# Patient Record
Sex: MALE | Race: WHITE | URBAN - METROPOLITAN AREA
[De-identification: names, ages, dates, MRNs, and addresses within clinical notes are randomized per-mention and may not be internally consistent; named-entity substitution may affect disease eponyms.]

---

## 2018-01-16 ENCOUNTER — HOSPITAL ENCOUNTER (OUTPATIENT)
Dept: NEUROLOGY | Age: 43
Discharge: HOME | End: 2018-01-16
Attending: FAMILY MEDICINE | Admitting: FAMILY MEDICINE

## 2018-01-16 DIAGNOSIS — R20.0 ANESTHESIA OF SKIN: ICD-10-CM

## 2018-01-16 NOTE — PROCEDURES
[address] Nicholas A. Jamey Pulley, M.D. Phone:  [phone]  Fax:  [phone]      Electrodiagnostic Report  Test Date:  2018    Patient: Zachary Hester : 1975 Physician: Davion Bailon D.O.   Sex: Male Height:   Ref Phys: Mati Espinal MD     Patient Complaints:  Patient is a 36year-old male who presents with hand weakness with numbness and tingling onset couple years ago. Intermittent in nature. Patient History / Exam:  PMH: no endocrine disease. + finger surgery 15 yrs ago. no neck or arm surgery PE: reflexes trace, + thumb opposition weakness    Impression: bilateral carpal tunnel syndrome, moderate severity. No evidence of an acute radiculopathy or other entrapment neuropathy. Thank you. NCV & EMG Findings:  Evaluation of the left median motor and the right median motor nerves showed prolonged distal onset latency (L4.7, R5.4 ms). The left median sensory and the right median sensory nerves showed prolonged distal peak latency (L4.8, R5.0 ms). The left ulnar sensory nerve showed reduced amplitude (10.2 µV). All remaining nerves (as indicated in the following tables) were within normal limits. Left vs. Right side comparison data for the ulnar motor nerve indicates abnormal L-R amplitude difference (58.2 %). The ulnar sensory nerve indicates abnormal L-R amplitude difference (64.8 %). All remaining left vs. right side differences were within normal limits. All examined muscles (as indicated in the following table) showed no evidence of electrical instability.                     Davion Bailon D.O.        Nerve Conduction Studies  Anti Sensory Summary Table     Stim Site NR Peak (ms) Norm Peak (ms) P-T Amp (µV) Norm P-T Amp Site1 Site2 Delta-P (ms) Dist (cm) Emanuel (m/s) Norm Emanuel (m/s)   Left Median Anti Sensory (2nd Digit)   Wrist    4.8 <3.6 13.9 >10 Wrist 2nd Digit 4.8 14.0 29    Right Median Anti Nml 0 Nml Nml    Left BrachioRad Radial C5-6 Nml Nml Nml Nml Nml 0 Nml Nml    Left PronatorTeres Median C6-7 Nml Nml Nml Nml Nml 0 Nml Nml    Left Ext Indicis Radial (Post Int) C7-8 Nml Nml Nml Nml Nml 0 Nml Nml    Left 1stDorInt Ulnar C8-T1 Nml Nml Nml Nml Nml 0 Nml Nml    Left Abd Poll Brev Median C8-T1 Nml Nml Nml Nml Nml 0 Nml Nml    Left Cervical Parasp Up Rami C1-3 Nml Nml Nml         Left Cervical Parasp Mid Rami C4-6 Nml Nml Nml         Left Cervical Parasp Low Rami C7-8 Nml Nml Nml           Electronically signed by Lela Beltran DO on 1/16/2018 at 9:00 AM

## 2020-02-12 ENCOUNTER — HOSPITAL ENCOUNTER (OUTPATIENT)
Dept: PHYSICAL THERAPY | Age: 45
Setting detail: THERAPIES SERIES
Discharge: HOME | End: 2020-02-12
Payer: COMMERCIAL

## 2020-02-12 PROCEDURE — 97110 THERAPEUTIC EXERCISES: CPT

## 2020-02-12 PROCEDURE — 97161 PT EVAL LOW COMPLEX 20 MIN: CPT
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
## 2020-02-12 NOTE — FLOWSHEET NOTE
Physical Therapy Daily Treatment Note    Date:  2020    Patient Name:  Nicki Gallegos    :  1975  MRN: [id number]  Restrictions/Precautions:    Medical/Treatment Diagnosis Information:  · Diagnosis: M54.42 chronic L-sided low back pain with L-sided sciatica  Insurance/Certification information:  PT Insurance Information: [employer] -- 30 visits per yr  Physician Information:  Referring Practitioner: Carroll Cristobal DO  Plan of care signed (Y/N):    Visit# / total visits:       G-Code (if applicable):           Modified Oswestry:  36% disability at eval      Time in:   10:15      Timed Treatment: 8  Total Treatment Time:  45    ________________________________________________________________________________________    Pain Level:   -8 /10  SUBJECTIVE:  See eval    OBJECTIVE: see eval    Exercise/Equipment Resistance/Repetitions Other comments     TG nv      Multifidus punch                                          Magnetic click with SB nv      Ab stabilization with BP cuff nv       Neuro re-ed with dowel nv       Foam roll protocol nv                                                              Other Therapeutic Activities:  eval completed, HEP issued and practiced    HEP:  Self-MET for L AI, bridge as parul, cat/camel, quadruped multifidus    Manual Treatments:       MET for L AI nv  L lumbar opening mobilization nv    Modalities:      Test/Measurements:         ASSESSMENT:     See eval    Treatment/Activity Tolerance:   []Patient tolerated treatment well [] Patient limited by fatique  []Patient limited by pain [] Patient limited by other medical complications  [] Other:     Goals:    Short term goals  Time Frame for Short term goals: 3 wks  Short term goal 1: Pt will decrease pain by 50% in frequency or intensity  Short term goal 2: Pt will be ind and compliant with HEP  Short term goal 3: Pt will deny a limitation with sleeping due to pain  Short term goal 4: Pt will deny a functional limitation

## 2020-02-18 ENCOUNTER — HOSPITAL ENCOUNTER (OUTPATIENT)
Dept: PHYSICAL THERAPY | Age: 45
Setting detail: THERAPIES SERIES
Discharge: HOME | End: 2020-02-18
Payer: COMMERCIAL

## 2020-02-18 PROCEDURE — 97110 THERAPEUTIC EXERCISES: CPT

## 2020-02-18 NOTE — FLOWSHEET NOTE
Physical Therapy Daily Treatment Note    Date:  2020    Patient Name:  Kenn Malhotra    :  1975  MRN: [id number]  Restrictions/Precautions:    Medical/Treatment Diagnosis Information:  · Diagnosis: M54.42 chronic L-sided low back pain with L-sided sciatica  Insurance/Certification information:  [facility]  Physician Information:  Referring Practitioner: Ammon Zamora DO  Plan of care signed (Y/N):    Visit# / total visits:       G-Code (if applicable): Modified Oswestry:  36% disability at eval      Time in:   12:01      Timed Treatment: 30  Total Treatment Time:  36    ________________________________________________________________________________________    Pain Level:   4-5 /10  SUBJECTIVE:  Has been having pain with prolonged sitting. Has been inconsistent with HEP    OBJECTIVE: see eval    Exercise/Equipment Resistance/Repetitions Other comments     TG x5'      Multifidus punch Sarahi Arevalo x15 B                                         Magnetic click with SB 6D40\"      Ab stabilization with BP cuff 10\"x10 With LE movements nv      Neuro re-ed with dowel x10 in stand       Foam roll protocol rest x3'  Alt shld flex x10 B  Ceiling reach x10 B  shld horizontal ABD x10  Rest x1'                                                              Other Therapeutic Activities:      HEP:  Self-MET for L AI, bridge as parul, cat/camel, quadruped multifidus    Manual Treatments:       MET for L AI 5\"x3 in prone  L lumbar opening mobilization     Modalities:      Test/Measurements:         ASSESSMENT:     parul treatment well. Noted relief with lumbar opening.     Treatment/Activity Tolerance:   [x]Patient tolerated treatment well [] Patient limited by fatique  []Patient limited by pain [] Patient limited by other medical complications  [] Other:     Goals:    Short term goals  Time Frame for Short term goals: 3 wks  Short term goal 1: Pt will decrease pain by

## 2020-02-21 ENCOUNTER — HOSPITAL ENCOUNTER (OUTPATIENT)
Dept: PHYSICAL THERAPY | Age: 45
Setting detail: THERAPIES SERIES
Discharge: HOME | End: 2020-02-21
Payer: COMMERCIAL

## 2020-02-21 PROCEDURE — 97110 THERAPEUTIC EXERCISES: CPT

## 2020-02-21 NOTE — FLOWSHEET NOTE
test    Treatment/Activity Tolerance:   [x]Patient tolerated treatment well [] Patient limited by fatique  []Patient limited by pain [] Patient limited by other medical complications  [] Other:     Goals:    Short term goals  Time Frame for Short term goals: 3 wks  Short term goal 1: Pt will decrease pain by 50% in frequency or intensity  Short term goal 2: Pt will be ind and compliant with HEP  Short term goal 3: Pt will deny a limitation with sleeping due to pain  Short term goal 4: Pt will deny a functional limitation with sitting due to pain  Short term goal 5: Pt will maintain pelvic alignment between sessions     Long term goals  Time Frame for Long term goals : 6 wks  Long term goal 1: Pt will decrease pain by % in frequency or intensity  Long term goal 2: Pt will deny a limitation with standing due to pain  Long term goal 3: Pt will present with full, painless lumbar AROM  Long term goal 4: Pt will demonstrate proper mechanics with bending and lifting     Plan: [x] Continue per plan of care [] Alter current plan (see comments)   [] Plan of care initiated [] Hold pending MD visit [] Discharge      Plan for Next Session:      Re-Certification Due Date:         Signature:  Pawan Fry , CHRIS

## 2020-02-25 ENCOUNTER — HOSPITAL ENCOUNTER (OUTPATIENT)
Dept: PHYSICAL THERAPY | Age: 45
Setting detail: THERAPIES SERIES
Discharge: HOME | End: 2020-02-25
Payer: COMMERCIAL

## 2020-02-25 PROCEDURE — 97110 THERAPEUTIC EXERCISES: CPT

## 2020-02-25 PROCEDURE — 97140 MANUAL THERAPY 1/> REGIONS: CPT

## 2020-02-28 ENCOUNTER — HOSPITAL ENCOUNTER (OUTPATIENT)
Dept: PHYSICAL THERAPY | Age: 45
Setting detail: THERAPIES SERIES
Discharge: HOME | End: 2020-02-28
Payer: COMMERCIAL

## 2020-02-28 NOTE — PROGRESS NOTES
Physical Therapy  Cancellation/No-show Note  Patient Name:  Subhash Wiggins  :  1975   Date:  2020  Cancels to date: 1  No-shows to date: 0    For today's appointment patient:  [x] Cancelled  [] Rescheduled appointment  [] No-show     Reason given by patient:  [] Patient ill  [] Conflicting appointment  [] No transportation    [x] Conflict with work  [] No reason given  [] Other:      Comments:  Stuck on the other side of [facility]    Electronically signed by:  Bebeto Allen PT

## 2020-03-03 ENCOUNTER — HOSPITAL ENCOUNTER (OUTPATIENT)
Dept: PHYSICAL THERAPY | Age: 45
Setting detail: THERAPIES SERIES
Discharge: HOME | End: 2020-03-03
Payer: COMMERCIAL

## 2020-03-03 PROCEDURE — 97140 MANUAL THERAPY 1/> REGIONS: CPT

## 2020-03-03 PROCEDURE — 97110 THERAPEUTIC EXERCISES: CPT

## 2020-03-06 ENCOUNTER — HOSPITAL ENCOUNTER (OUTPATIENT)
Dept: PHYSICAL THERAPY | Age: 45
Setting detail: THERAPIES SERIES
Discharge: HOME | End: 2020-03-06
Payer: COMMERCIAL

## 2020-03-06 PROCEDURE — 97140 MANUAL THERAPY 1/> REGIONS: CPT

## 2020-03-06 PROCEDURE — 97110 THERAPEUTIC EXERCISES: CPT

## 2020-03-06 NOTE — FLOWSHEET NOTE
Physical Therapy Daily Treatment Note    Date:  3/6/2020    Patient Name:  Teofilo Null    :  1975  MRN: [id number]  Restrictions/Precautions:    Medical/Treatment Diagnosis Information:  · Diagnosis: M54.42 chronic L-sided low back pain with L-sided sciatica  Insurance/Certification information:  PT Insurance Information: [facility] -- 30 visits per yr  Physician Information:  Referring Practitioner: Aden Smith DO  Plan of care signed (Y/N):    Visit# / total visits:       G-Code (if applicable): Modified Oswestry:  36% disability at eval      Time in:   9:05      Timed Treatment: 44  Total Treatment Time:  44    ________________________________________________________________________________________    Pain Level:   3-10  SUBJECTIVE:  Had R-sided back pain the morning after LV, and L-sided LBP last night. OBJECTIVE: (-) supine to long sit test.  L1 in L rotation.     Exercise/Equipment Resistance/Repetitions Other comments     TG x6'      Multifidus punch maroon x20 B    Posterior sling Maroon & 4\" step x15 B      Lateral sling Moses TB x10 B             Prone alt shld flex & hip ext x10 B      Bridge with SB under LEs 3L64      Magnetic click with SB 1N76\"      Ab stabilization with BP cuff  with knee fallouts x10 B Unable to stabilize with march      Neuro re-ed with dowel        Foam roll protocol rest x3'  Alt shld flex x10 B  Ceiling reach x10 B  shld horizontal ABD x10  Rest x1'      Side planks       Anterior sling 5\"x5 B      Hand-heel rock x5                                         Other Therapeutic Activities:      HEP:  Self-MET for L AI, bridge as parul, cat/camel, quadruped multifidus, multifidus punch with gray TB, side planks    Manual Treatments:           R sacral PA mobs gr IV, L L1 PA mob gr IV  Lumbar PA mobs gr IV   R lumbar opening mobilization  sidelying R lumbopelvic roll gr IV   DTM to lumbar paraspinals  RLE distraction in L sidelying with oscillations

## 2020-03-09 ENCOUNTER — HOSPITAL ENCOUNTER (OUTPATIENT)
Dept: PHYSICAL THERAPY | Age: 45
Setting detail: THERAPIES SERIES
Discharge: HOME | End: 2020-03-09
Payer: COMMERCIAL

## 2020-03-09 PROCEDURE — 97110 THERAPEUTIC EXERCISES: CPT

## 2020-03-09 PROCEDURE — 97140 MANUAL THERAPY 1/> REGIONS: CPT

## 2020-03-09 NOTE — FLOWSHEET NOTE
Test/Measurements:         ASSESSMENT:     Normal lumbar and SI alignment. Good joint mobility but felt relief with PA mobs.      Treatment/Activity Tolerance:   [x]Patient tolerated treatment well [] Patient limited by fatique  []Patient limited by pain [] Patient limited by other medical complications  [] Other:     Goals:    Short term goals  Time Frame for Short term goals: 3 wks  Short term goal 1: Pt will decrease pain by 50% in frequency or intensity  Short term goal 2: Pt will be ind and compliant with HEP  Short term goal 3: Pt will deny a limitation with sleeping due to pain  Short term goal 4: Pt will deny a functional limitation with sitting due to pain  Short term goal 5: Pt will maintain pelvic alignment between sessions     Long term goals  Time Frame for Long term goals : 6 wks  Long term goal 1: Pt will decrease pain by % in frequency or intensity  Long term goal 2: Pt will deny a limitation with standing due to pain  Long term goal 3: Pt will present with full, painless lumbar AROM  Long term goal 4: Pt will demonstrate proper mechanics with bending and lifting     Plan: [x] Continue per plan of care [] Alter current plan (see comments)   [] Plan of care initiated [] Hold pending MD visit [] Discharge      Plan for Next Session:      Re-Certification Due Date:         Signature:  Blinda Boeck , PT

## 2020-03-10 ENCOUNTER — APPOINTMENT (OUTPATIENT)
Dept: PHYSICAL THERAPY | Age: 45
End: 2020-03-10
Payer: COMMERCIAL

## 2020-03-13 ENCOUNTER — HOSPITAL ENCOUNTER (OUTPATIENT)
Dept: PHYSICAL THERAPY | Age: 45
Setting detail: THERAPIES SERIES
Discharge: HOME | End: 2020-03-13
Payer: COMMERCIAL

## 2020-03-13 PROCEDURE — 97140 MANUAL THERAPY 1/> REGIONS: CPT

## 2020-03-13 PROCEDURE — 97110 THERAPEUTIC EXERCISES: CPT

## 2020-03-16 ENCOUNTER — HOSPITAL ENCOUNTER (OUTPATIENT)
Dept: PHYSICAL THERAPY | Age: 45
Setting detail: THERAPIES SERIES
Discharge: HOME | End: 2020-03-16
Payer: COMMERCIAL

## 2020-03-16 PROCEDURE — 97140 MANUAL THERAPY 1/> REGIONS: CPT

## 2020-03-16 PROCEDURE — 97110 THERAPEUTIC EXERCISES: CPT

## 2020-03-17 ENCOUNTER — APPOINTMENT (OUTPATIENT)
Dept: PHYSICAL THERAPY | Age: 45
End: 2020-03-17
Payer: COMMERCIAL

## 2020-03-19 ENCOUNTER — APPOINTMENT (OUTPATIENT)
Dept: PHYSICAL THERAPY | Age: 45
End: 2020-03-19
Payer: COMMERCIAL

## 2020-03-20 ENCOUNTER — HOSPITAL ENCOUNTER (OUTPATIENT)
Dept: PHYSICAL THERAPY | Age: 45
Setting detail: THERAPIES SERIES
End: 2020-03-20
Payer: COMMERCIAL

## 2020-03-23 ENCOUNTER — APPOINTMENT (OUTPATIENT)
Dept: PHYSICAL THERAPY | Age: 45
End: 2020-03-23
Payer: COMMERCIAL

## 2020-03-24 ENCOUNTER — APPOINTMENT (OUTPATIENT)
Dept: PHYSICAL THERAPY | Age: 45
End: 2020-03-24
Payer: COMMERCIAL

## 2020-03-27 ENCOUNTER — APPOINTMENT (OUTPATIENT)
Dept: PHYSICAL THERAPY | Age: 45
End: 2020-03-27
Payer: COMMERCIAL

## 2021-02-19 ENCOUNTER — HOSPITAL ENCOUNTER (OUTPATIENT)
Dept: PHYSICAL THERAPY | Age: 46
Setting detail: THERAPIES SERIES
Discharge: HOME | End: 2021-02-19
Payer: COMMERCIAL

## 2021-02-19 PROCEDURE — 97140 MANUAL THERAPY 1/> REGIONS: CPT

## 2021-02-19 PROCEDURE — 97161 PT EVAL LOW COMPLEX 20 MIN: CPT

## 2021-02-19 NOTE — PROGRESS NOTES
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 Notes that the pain is focused on his lower left side. Notes that this stated about 3-4 weeks ago. Usually his pain comes and goes. Notes that pain increases with activity. Denies pain in LEs or buttocks. Does report B hand N&T at night. Also reports previous neck injuries and pain - minor. Denies balance issues. Notes that his legs are pretty strong, no noticeable differences. 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 Objective     Observation/Palpation  Posture: Fair  Palpation: Standing - L iliac crest elevated, LLE pronation; Supine - LLE longer, L ASIS elevated, iliac crest level  Observation: gait - pt ambulated with lumbar extension rotation, decreased glut activation  Body Mechanics: SLS - Trendelenburg BLE, L worse than R    AROM RLE (degrees)  R Hip External Rotation 0-45: 0-25*  AROM LLE (degrees)  LLE AROM : WFL  Spine  Lumbar: Flexion WFL; Extension [facility] but with R glide;  Sidebending - R WFL, L decreased 25% with catch  Joint Mobility  Spine: lumbar - hypomobile to PA joint play throughout lumbar spine    Strength RLE  Comment: hip ER 3+/5, hip ABD 4-/5, glut max 4-/5  Strength LLE  Comment: hip ER 3+/5, hip ABD 4-/5, glut max 4-/5     Additional Measures  Special Tests: (+) slump test LLE; DTRs - L3/4 and S1/2 2+ BLE  Sensation  Overall Sensation Status: WFL     Assessment   Conditions Requiring Skilled Therapeutic Intervention  Body structures, Functions, Activity limitations: Decreased ADL status; Decreased high-level IADLs; Increased pain;Decreased functional mobility ; Decreased ROM  Assessment: Pt presents with L posterior innominate pelvic rotation and associated leg length differential. Pt also demonstrated extension rotation patterns of lumbar spine and poor muscle firing patterns of lumbopelvic stabilizers.   Prognosis: Good  Decision Making: Low Complexity  REQUIRES PT FOLLOW UP: Yes  Activity Tolerance  Activity Tolerance: Patient Tolerated treatment well         Plan   Plan  Times per week: 2x/wk  Plan weeks: 4 weeks  Current Treatment Recommendations: Strengthening, Manual Therapy - Joint Manipulation, Neuromuscular Re-education, Home Exercise Program, Manual Therapy - Soft Tissue Mobilization, ROM, Endurance Training, Gait Training    G-Code   Modified Oswestry 21/50    Goals  Short term goals  Time Frame for Short term goals: 4 weeks  Short term goal 1: Pt will decrease pain by 50% in frequency or intensity  Short term goal 2: Pt will be ind and compliant with HEP  Short term goal 3: Pt will demonstrate an increase in lumbar joint mobility  Short term goal 4: Pt will ambulate with increased glut activation  Short term goal 5: Pt will maintain pelvic alignment between sessions       Therapy Time   Individual Concurrent Group Co-treatment   Time In 0800         Time Out 0845         Minutes 45         Timed Code Treatment Minutes: [address]  [place], [address] [facility] [address]

## 2021-02-19 NOTE — FLOWSHEET NOTE
[facility], [facility], [address] [place] [address]  Phone: [phone]  Fax [phone]    Physical Therapy Daily Treatment Note    Date:  2021    Patient Name:  Jono Hull    :  1975  MRN: [id number]  Restrictions/Precautions:    Medical/Treatment Diagnosis Information:  · Diagnosis: left sided LBP with sciatica  Insurance/Certification information:  PT Insurance Information: [facility]  Physician Information:  Referring Practitioner: Ashwin Ennis MD  Plan of care signed (Y/N): N  Visit# / total visits:       G-Code (if applicable):          ASA     Time in:   8:00     Timed Treatment: 10 Total Treatment Time:  45  ________________________________________________________________________________________    Pain Level:    /10  SUBJECTIVE:  See eval    OBJECTIVE:     Exercise/Equipment Resistance/Repetitions Other comments                                                                                                                                             Other Therapeutic Activities:      Manual Treatments:   Prone SI distraction mobilization grade V; sidelying lumbar opening mobilization grade IV; LLE leg pull      Modalities:      Test/Measurements:  See eval       ASSESSMENT:    See eval     Treatment/Activity Tolerance:   [x]Patient tolerated treatment well [] Patient limited by fatique  []Patient limited by pain [] Patient limited by other medical complications  [] Other:     Goals:    Short term goals  Time Frame for Short term goals: 4 weeks  Short term goal 1: Pt will decrease pain by 50% in frequency or intensity  Short term goal 2: Pt will be ind and compliant with HEP  Short term goal 3: Pt will demonstrate an increase in lumbar joint mobility  Short term goal 4: Pt will ambulate with increased glut activation  Short term goal 5: Pt will maintain pelvic alignment between sessions Plan: [x] Continue per plan of care [] Alter current plan (see comments)   [x] Plan of care initiated [] Hold pending MD visit [] Discharge      Plan for Next Session:  Biomechanical correction of problems as they present. Facilitate correct muscle firing patterns and activation with appropriate activities. Progress patient as tolerated.      Re-Certification Due Date:         Signature:  Farshad Plaza PT

## 2021-02-24 ENCOUNTER — HOSPITAL ENCOUNTER (OUTPATIENT)
Dept: PHYSICAL THERAPY | Age: 46
Setting detail: THERAPIES SERIES
Discharge: HOME | End: 2021-02-24
Payer: COMMERCIAL

## 2021-02-24 PROCEDURE — 97140 MANUAL THERAPY 1/> REGIONS: CPT

## 2021-02-24 PROCEDURE — 97110 THERAPEUTIC EXERCISES: CPT

## 2021-02-24 NOTE — FLOWSHEET NOTE
[facility], [facility], [address] [place] [address]  Phone: [phone]  Fax [phone]    Physical Therapy Daily Treatment Note    Date:  2021    Patient Name:  Mu Barreto    :  1975  MRN: [id number]  Restrictions/Precautions:    Medical/Treatment Diagnosis Information:  · Diagnosis: left sided LBP with sciatica  Insurance/Certification information:  [facility]  Physician Information:  Referring Practitioner: Donna Apodaca MD  Plan of care signed (Y/N): N  Visit# / total visits:       G-Code (if applicable):          ASA     Time in:   9:30     Timed Treatment: 45 Total Treatment Time:  45  ________________________________________________________________________________________    Pain Level:    /10  SUBJECTIVE:  Pt reports that he is feeling better. Has been resting and taking it easy which has been helping. OBJECTIVE:     Exercise/Equipment Resistance/Repetitions Other comments          TA set      BKF Until control demo'd  2 min  x10 B    bridge 10x5\"    Supine clams  SL hip ER with stabilization x10  x10 B    Supine lumbopelvic stabilization  2x1min                                                                                                           Other Therapeutic Activities:      Manual Treatments:   Prone SI distraction mobilization grade V; sidelying lumbar opening mobilization grade IV; LLE leg pull; supine SI distraction mobilization with cavitation; lumbar breaking bread      Modalities:      Test/Measurements:  See eval       ASSESSMENT:    Pt tolerated session well. Corrected SIJD through manual therapy. Initiated HEP without issue.      Treatment/Activity Tolerance:   [x]Patient tolerated treatment well [] Patient limited by fatique  []Patient limited by pain [] Patient limited by other medical complications  [] Other:     Goals:    Short term goals  Time Frame for Short term goals: 4 weeks  Short term goal 1: Pt will decrease pain by 50% in frequency or intensity  Short term goal 2: Pt will be ind and compliant with HEP  Short term goal 3: Pt will demonstrate an increase in lumbar joint mobility  Short term goal 4: Pt will ambulate with increased glut activation  Short term goal 5: Pt will maintain pelvic alignment between sessions           Plan: [x] Continue per plan of care [] Alter current plan (see comments)   [] Plan of care initiated [] Hold pending MD visit [] Discharge      Plan for Next Session:  Biomechanical correction of problems as they present. Facilitate correct muscle firing patterns and activation with appropriate activities. Progress patient as tolerated.      Re-Certification Due Date:         Signature:  Jasbir Garcias PT

## 2021-02-26 ENCOUNTER — HOSPITAL ENCOUNTER (OUTPATIENT)
Dept: PHYSICAL THERAPY | Age: 46
Setting detail: THERAPIES SERIES
Discharge: HOME | End: 2021-02-26
Payer: COMMERCIAL

## 2021-02-26 PROCEDURE — 97140 MANUAL THERAPY 1/> REGIONS: CPT

## 2021-02-26 PROCEDURE — 97110 THERAPEUTIC EXERCISES: CPT

## 2021-02-26 NOTE — FLOWSHEET NOTE
Patient limited by fatique  []Patient limited by pain [] Patient limited by other medical complications  [] Other:     Goals:    Short term goals  Time Frame for Short term goals: 4 weeks  Short term goal 1: Pt will decrease pain by 50% in frequency or intensity  Short term goal 2: Pt will be ind and compliant with HEP  Short term goal 3: Pt will demonstrate an increase in lumbar joint mobility  Short term goal 4: Pt will ambulate with increased glut activation  Short term goal 5: Pt will maintain pelvic alignment between sessions           Plan: [x] Continue per plan of care [] Alter current plan (see comments)   [] Plan of care initiated [] Hold pending MD visit [] Discharge      Plan for Next Session:  Biomechanical correction of problems as they present. Facilitate correct muscle firing patterns and activation with appropriate activities. Progress patient as tolerated.      Re-Certification Due Date:         Signature:  Jackson Garcia PT

## 2021-03-03 ENCOUNTER — HOSPITAL ENCOUNTER (OUTPATIENT)
Dept: PHYSICAL THERAPY | Age: 46
Setting detail: THERAPIES SERIES
Discharge: HOME | End: 2021-03-03
Payer: COMMERCIAL

## 2021-03-03 PROCEDURE — 97140 MANUAL THERAPY 1/> REGIONS: CPT

## 2021-03-03 PROCEDURE — 97110 THERAPEUTIC EXERCISES: CPT

## 2021-03-03 NOTE — FLOWSHEET NOTE
[facility], [facility], [address] [facility], [address] [place] [address] Dr  Phone: [phone]  Fax [phone]    Physical Therapy Daily Treatment Note    Date:  3/3/2021    Patient Name:  Ashanti Knight    :  1975  MRN: [id number]  Restrictions/Precautions:    Medical/Treatment Diagnosis Information:  · Diagnosis: left sided LBP with sciatica  Insurance/Certification information:  [facility] [place]  Physician Information:  Referring Practitioner: Randa Isbell MD  Plan of care signed (Y/N): N  Visit# / total visits:       G-Code (if applicable):          ASA     Time in:   2:45    Timed Treatment: 45 Total Treatment Time:  45  ________________________________________________________________________________________    Pain Level:    /10  SUBJECTIVE:  Pt reports that he is in doing okay today but was pretty bad yesterday. Notes that it shifted over to the L side a little. OBJECTIVE:     Exercise/Equipment Resistance/Repetitions Other comments   TG 5 min    TA set     BKF 15x5\" hold  x10 B BP cuff   bridge 10x5\"    Supine clams  SL hip ER with stabilization x10  x10 B    Supine lumbopelvic stabilization  2x1min    3-way ball compression 5x10\"    PHE x10 B Dowel coty   Quadruped multif hip lift x10 B           Multif press x15 B    B shoulder extension x15    Standing hip abduction x15 B 15#                                                      Other Therapeutic Activities:      Manual Treatments:   ; R keanu without cavitation; sidelying lumbar opening mobilization grade IV R with cavitation; RLE leg pull; supine SI distraction mobilization with cavitation; lumbar breaking bread      Modalities:      Test/Measurements:  R ASIS inferior, RLE shorter       ASSESSMENT:    Pt tolerated session well. Corrected SIJD through manual therapy. Pt with increased mobility and lessened symptoms following session.  Progressed to standing exercise without issue, but pt did require minimal cueing for lumbopelvic and lateral hip stabilization. Treatment/Activity Tolerance:   [x]Patient tolerated treatment well [] Patient limited by fatique  []Patient limited by pain [] Patient limited by other medical complications  [] Other:     Goals:    Short term goals  Time Frame for Short term goals: 4 weeks  Short term goal 1: Pt will decrease pain by 50% in frequency or intensity  Short term goal 2: Pt will be ind and compliant with HEP  Short term goal 3: Pt will demonstrate an increase in lumbar joint mobility  Short term goal 4: Pt will ambulate with increased glut activation  Short term goal 5: Pt will maintain pelvic alignment between sessions           Plan: [x] Continue per plan of care [] Alter current plan (see comments)   [] Plan of care initiated [] Hold pending MD visit [] Discharge      Plan for Next Session:  Biomechanical correction of problems as they present. Facilitate correct muscle firing patterns and activation with appropriate activities. Progress patient as tolerated.      Re-Certification Due Date:         Signature:  Cathy May , PT

## 2021-03-03 NOTE — PROGRESS NOTES
Physical Therapy  Cancellation/No-show Note  Patient Name:  Destiney Amin  :  1975   Date:  3/3/2021  Cancels to date: 0  No-shows to date: 1    For today's appointment patient:  [] Cancelled  [] Rescheduled appointment  [x] No-show     Reason given by patient:  [] Patient ill  [] Conflicting appointment  [] No transportation    [] Conflict with work  [] No reason given  [] Other:     Comments:      Electronically signed by:  Nam Yates PT

## 2021-03-05 ENCOUNTER — HOSPITAL ENCOUNTER (OUTPATIENT)
Dept: PHYSICAL THERAPY | Age: 46
Setting detail: THERAPIES SERIES
Discharge: HOME | End: 2021-03-05
Payer: COMMERCIAL

## 2021-03-05 PROCEDURE — 97110 THERAPEUTIC EXERCISES: CPT

## 2021-03-05 PROCEDURE — 97140 MANUAL THERAPY 1/> REGIONS: CPT

## 2021-03-05 NOTE — FLOWSHEET NOTE
[facility], [facility], [address] [facility], [address] [place] [address]  Phone: [phone]  Fax [phone]    Physical Therapy Daily Treatment Note    Date:  3/5/2021    Patient Name:  Deng Shukla    :  1975  MRN: [id number]  Restrictions/Precautions:    Medical/Treatment Diagnosis Information:  · Diagnosis: left sided LBP with sciatica  Insurance/Certification information:  PT [facility]: [facility] [place]  Physician Information:  Referring Practitioner: Lucina Murillo MD  Plan of care signed (Y/N): N  Visit# / total visits:       G-Code (if applicable):          ASA     Time in:   8:05    Timed Treatment: 45 Total Treatment Time:  45  ________________________________________________________________________________________    Pain Level:    /10  SUBJECTIVE:  Pt reports that he is in doing okay today but was pretty bad yesterday. Notes that it shifted over to the L side a little. OBJECTIVE:     Exercise/Equipment Resistance/Repetitions Other comments   TG 5 min    TA set     BKF 15x5\" hold  x10 B BP cuff   bridge 10x5\"    Supine clams  SL hip ER with stabilization x10  x10 B    Supine lumbopelvic stabilization  2x1min    3-way ball compression 5x10\"    PHE Dowel coyt   Quadruped multif hip lift           Multif press x15 B    B shoulder extension x15    Standing hip abduction x15 B 15#                                                      Other Therapeutic Activities:      Manual Treatments:   ; ; sidelying lumbar opening mobilization grade IV R with cavitation; RLE leg pull; supine SI distraction mobilization with cavitation; lumbar breaking bread      Modalities:      Test/Measurements:  R ASIS inferior, RLE shorter       ASSESSMENT:    Pt tolerated session well. Corrected SIJD through manual therapy. Pt with increased mobility and lessened symptoms following session.  Progressed to standing exercise without issue, but pt did require minimal cueing for lumbopelvic and lateral hip stabilization. Treatment/Activity Tolerance:   [x]Patient tolerated treatment well [] Patient limited by fatique  []Patient limited by pain [] Patient limited by other medical complications  [] Other:     Goals:    Short term goals  Time Frame for Short term goals: 4 weeks  Short term goal 1: Pt will decrease pain by 50% in frequency or intensity  Short term goal 2: Pt will be ind and compliant with HEP  Short term goal 3: Pt will demonstrate an increase in lumbar joint mobility  Short term goal 4: Pt will ambulate with increased glut activation  Short term goal 5: Pt will maintain pelvic alignment between sessions           Plan: [x] Continue per plan of care [] Alter current plan (see comments)   [] Plan of care initiated [] Hold pending MD visit [] Discharge      Plan for Next Session:  Biomechanical correction of problems as they present. Facilitate correct muscle firing patterns and activation with appropriate activities. Progress patient as tolerated.      Re-Certification Due Date:         Signature:  Earle Brown PT

## 2021-03-08 ENCOUNTER — HOSPITAL ENCOUNTER (OUTPATIENT)
Dept: PHYSICAL THERAPY | Age: 46
Setting detail: THERAPIES SERIES
Discharge: HOME | End: 2021-03-08
Payer: COMMERCIAL

## 2021-03-08 PROCEDURE — 97140 MANUAL THERAPY 1/> REGIONS: CPT

## 2021-03-08 PROCEDURE — 97110 THERAPEUTIC EXERCISES: CPT

## 2021-03-08 NOTE — FLOWSHEET NOTE
[facility], [facility], [address] [place] [address], [address] [place] [address]  Phone: [phone]  Fax [phone]    Physical Therapy Daily Treatment Note    Date:  3/8/2021    Patient Name:  Sawyer Macias    :  1975  MRN: [id number]  Restrictions/Precautions:    Medical/Treatment Diagnosis Information:  · Diagnosis: left sided LBP with sciatica  Insurance/Certification information:  [place] [facility] [place] [facility]  Physician Information:  Referring Practitioner: James Simon MD  Plan of care signed (Y/N): N  Visit# / total visits:       G-Code (if applicable):          ASA     Time in:   8:45    Timed Treatment: 45 Total Treatment Time:  45  ________________________________________________________________________________________    Pain Level:    /10  SUBJECTIVE:  Pt reports that he is in doing okay today but was pretty bad yesterday. Notes that it shifted over to the L side a little. OBJECTIVE:     Exercise/Equipment Resistance/Repetitions Other comments   TG 5 min    TA set     BKF 15x5\" hold  x10 B BP cuff   bridge 10x5\"    Supine clams  SL hip ER with stabilization     Supine lumbopelvic stabilization  2x1min    3-way ball compression 5x10\"    PHE Dowel coty   Quadruped multif hip lift           Multif press x15 B    B shoulder extension x15    Standing hip abduction x15 B 15#   Side-stepping 2x30' Grey tb                                               Other Therapeutic Activities:      Manual Treatments:   ; ; sidelying lumbar opening mobilization grade IV R with cavitation; RLE leg pull; supine SI distraction mobilization with cavitation; lumbar breaking bread      Modalities:      Test/Measurements:  R ASIS inferior, RLE shorter       ASSESSMENT:    Pt tolerated session well. Corrected SIJD through manual therapy. Pt with increased mobility and lessened symptoms following session.  Progressed to standing exercise without issue, but pt did require minimal cueing for lumbopelvic and lateral hip stabilization. Treatment/Activity Tolerance:   [x]Patient tolerated treatment well [] Patient limited by fatique  []Patient limited by pain [] Patient limited by other medical complications  [] Other:     Goals:    Short term goals  Time Frame for Short term goals: 4 weeks  Short term goal 1: Pt will decrease pain by 50% in frequency or intensity  Short term goal 2: Pt will be ind and compliant with HEP  Short term goal 3: Pt will demonstrate an increase in lumbar joint mobility  Short term goal 4: Pt will ambulate with increased glut activation  Short term goal 5: Pt will maintain pelvic alignment between sessions           Plan: [x] Continue per plan of care [] Alter current plan (see comments)   [] Plan of care initiated [] Hold pending MD visit [] Discharge      Plan for Next Session:  Biomechanical correction of problems as they present. Facilitate correct muscle firing patterns and activation with appropriate activities. Progress patient as tolerated.      Re-Certification Due Date:         Signature:  Akosua Flores , PT

## 2021-03-11 ENCOUNTER — HOSPITAL ENCOUNTER (OUTPATIENT)
Dept: PHYSICAL THERAPY | Age: 46
Setting detail: THERAPIES SERIES
Discharge: HOME | End: 2021-03-11
Payer: COMMERCIAL

## 2021-03-11 PROCEDURE — 97140 MANUAL THERAPY 1/> REGIONS: CPT

## 2021-03-11 PROCEDURE — 97110 THERAPEUTIC EXERCISES: CPT

## 2021-03-11 NOTE — FLOWSHEET NOTE
[facility], [place], [facility], [address] [place] [address] Dr  Phone: [phone]  Fax [phone]    Physical Therapy Daily Treatment Note    Date:  3/11/2021    Patient Name:  Garland Cardoso    :  1975  MRN: [id number]  Restrictions/Precautions:    Medical/Treatment Diagnosis Information:  · Diagnosis: left sided LBP with sciatica  Insurance/Certification information:  [place] [facility] [facility] [place]  Physician Information:  Referring Practitioner: Tani Gutierrez MD  Plan of care signed (Y/N): N  Visit# / total visits:       G-Code (if applicable):          ASA     Time in:   9:00    Timed Treatment: 45 Total Treatment Time:  45  ________________________________________________________________________________________    Pain Level:    /10  SUBJECTIVE:  Pt reports that he went back to the gym for the first time Monday. Notes he has been sore. Also reports that his back is getting better and he is becoming more functional.     OBJECTIVE:     Exercise/Equipment Resistance/Repetitions Other comments   TG 5 min    TA set     BKF 15x5\" hold  x10 B BP cuff   bridge 10x5\"    Supine clams  SL hip ER with stabilization     Supine lumbopelvic stabilization      3-way ball compression 5x10\"    PHE Dowel coty   Quadruped multif hip lift           Multif press x15 B    B shoulder extension x15    Standing hip abduction x15 B 15#   Side-stepping  Grey tb   Posterior sling step up x15 B                                         Other Therapeutic Activities:      Manual Treatments:   ; ; sidelying lumbar opening mobilization grade IV R with cavitation; RLE leg pull; supine SI distraction mobilization with cavitation; lumbar breaking bread      Modalities:      Test/Measurements:  R ASIS inferior, RLE shorter       ASSESSMENT:    Pt tolerated session well. Corrected SIJD through manual therapy.  Pt with increased mobility and lessened symptoms following session. Progressed to standing exercise without issue, but pt did require minimal cueing for lumbopelvic and lateral hip stabilization. Treatment/Activity Tolerance:   [x]Patient tolerated treatment well [] Patient limited by fatique  []Patient limited by pain [] Patient limited by other medical complications  [] Other:     Goals:    Short term goals  Time Frame for Short term goals: 4 weeks  Short term goal 1: Pt will decrease pain by 50% in frequency or intensity  Short term goal 2: Pt will be ind and compliant with HEP  Short term goal 3: Pt will demonstrate an increase in lumbar joint mobility  Short term goal 4: Pt will ambulate with increased glut activation  Short term goal 5: Pt will maintain pelvic alignment between sessions           Plan: [x] Continue per plan of care [] Alter current plan (see comments)   [] Plan of care initiated [] Hold pending MD visit [] Discharge      Plan for Next Session:  Biomechanical correction of problems as they present. Facilitate correct muscle firing patterns and activation with appropriate activities. Progress patient as tolerated.      Re-Certification Due Date:         Signature:  Watson Ospina , PT

## 2021-03-17 ENCOUNTER — HOSPITAL ENCOUNTER (OUTPATIENT)
Dept: PHYSICAL THERAPY | Age: 46
Setting detail: THERAPIES SERIES
Discharge: HOME | End: 2021-03-17
Payer: COMMERCIAL

## 2021-03-17 PROCEDURE — 97110 THERAPEUTIC EXERCISES: CPT

## 2021-03-17 PROCEDURE — 97140 MANUAL THERAPY 1/> REGIONS: CPT

## 2021-03-17 NOTE — FLOWSHEET NOTE
[facility], [facility], [address] [facility], [address]  Phone: [phone]  Fax [phone]    Physical Therapy Daily Treatment Note    Date:  3/17/2021    Patient Name:  Tabby Swanson    :  1975  MRN: [id number]  Restrictions/Precautions:    Medical/Treatment Diagnosis Information:  · Diagnosis: left sided LBP with sciatica  Insurance/Certification information:  [facility] [place]  Physician Information:  Referring Practitioner: Galina Holt MD  Plan of care signed (Y/N): N  Visit# / total visits:       G-Code (if applicable):          ASA     Time in:   9:30    Timed Treatment: 45 Total Treatment Time:  45  ________________________________________________________________________________________    Pain Level:    /10  SUBJECTIVE:  Pt reports that his back has been doing better. Notes that he is not having pain while lying in bed anymore. He has bene abl eot return to the gym. OBJECTIVE:     Exercise/Equipment Resistance/Repetitions Other comments   TG 5 min    TA set     BKF 15x5\" hold  x10 B BP cuff   bridge 10x5\"    Supine clams  SL hip ER with stabilization     Supine lumbopelvic stabilization      3-way ball compression 5x10\"    PHE Dowel coty   Quadruped multif hip lift           Multif press x15 B    B shoulder extension x15    Standing hip abduction x15 B 15#   Side-stepping  Grey tb   Posterior sling step up x15 B                                         Other Therapeutic Activities:      Manual Treatments:   ; ; sidelying lumbar opening mobilization grade IV R with cavitation; RLE leg pull; supine SI distraction mobilization with cavitation; lumbar breaking bread      Modalities:      Test/Measurements:  R ASIS inferior, RLE shorter       ASSESSMENT:    Pt tolerated session well. Corrected SIJD through manual therapy. Pt with increased mobility and lessened symptoms following session. Progressed to standing exercise without issue, but pt did require minimal cueing for lumbopelvic and lateral hip stabilization. Treatment/Activity Tolerance:   [x]Patient tolerated treatment well [] Patient limited by fatique  []Patient limited by pain [] Patient limited by other medical complications  [] Other:     Goals:    Short term goals  Time Frame for Short term goals: 4 weeks  Short term goal 1: Pt will decrease pain by 50% in frequency or intensity  Short term goal 2: Pt will be ind and compliant with HEP  Short term goal 3: Pt will demonstrate an increase in lumbar joint mobility  Short term goal 4: Pt will ambulate with increased glut activation  Short term goal 5: Pt will maintain pelvic alignment between sessions           Plan: [x] Continue per plan of care [] Alter current plan (see comments)   [] Plan of care initiated [] Hold pending MD visit [] Discharge      Plan for Next Session:  Biomechanical correction of problems as they present. Facilitate correct muscle firing patterns and activation with appropriate activities. Progress patient as tolerated.      Re-Certification Due Date:         Signature:  Marianne Rock , PT

## 2021-03-17 NOTE — PROGRESS NOTES
[facility], [facility], [address]   Phone: [phone]  Fax [phone]    Physical Therapy Progress Note    Date:  3/17/2021    Patient Name:  Mu Barreto    :  1975  MRN: [id number]  Restrictions/Precautions:    Medical/Treatment Diagnosis Information:  · Diagnosis: left sided LBP with sciatica  Insurance/Certification information:  [facility] [place]  Physician Information:  Referring Practitioner: Donna Apodaca MD  Plan of care signed (Y/N): N  Visit# / total visits:     ________________________________________________________________________________________    Pain Level:    /10  SUBJECTIVE:  Pt reports that his back has been doing better. Notes that he is not having pain while lying in bed anymore. He has been able to return to the gym. ASSESSMENT:    Pt has completed 8 visits of physical therapy and has shown good improvements with strength, ROM, movement mechanics and functional mobility. Pt no longer displaying consistent lumbar symptoms. Pt does continue with altered movement mechanics during standing tasks. Pt would benefit from additional therapy at 2x/wk for 2 weeks to address remaining impairments and return to work without limitations.     Treatment/Activity Tolerance:   [x]Patient tolerated treatment well [] Patient limited by fatique  []Patient limited by pain [] Patient limited by other medical complications  [] Other:     Goals:    Short term goals  Time Frame for Short term goals: 4 weeks  Short term goal 1: Pt will decrease pain by 50% in frequency or intensity - MET  Short term goal 2: Pt will be ind and compliant with HEP - MET  Short term goal 3: Pt will demonstrate an increase in lumbar joint mobility - MET  Short term goal 4: Pt will ambulate with increased glut activation -progressing towards  Short term goal 5: Pt will maintain pelvic alignment between sessions - MEt          Plan: [] Continue per plan of care [x] Alter current plan (see comments)   [] Plan of care initiated [] Hold pending MD visit [] Discharge         Signature:  Kevan De La Torre , PT

## 2021-03-19 ENCOUNTER — HOSPITAL ENCOUNTER (OUTPATIENT)
Dept: PHYSICAL THERAPY | Age: 46
Setting detail: THERAPIES SERIES
Discharge: HOME | End: 2021-03-19
Payer: COMMERCIAL

## 2021-03-19 PROCEDURE — 97140 MANUAL THERAPY 1/> REGIONS: CPT

## 2021-03-19 PROCEDURE — 97110 THERAPEUTIC EXERCISES: CPT

## 2021-03-19 NOTE — FLOWSHEET NOTE
[facility] 79. and Therapy, [address]  Phone: [phone]  Fax [phone]    Physical Therapy Daily Treatment Note    Date:  3/19/2021    Patient Name:  Reena Mclain    :  1975  MRN: [id number]  Restrictions/Precautions:    Medical/Treatment Diagnosis Information:  · Diagnosis: left sided LBP with sciatica  Insurance/Certification information:  [place] [facility] [place] [facility] [place] [facility] [place]  Physician Information:  Referring Practitioner: Luis Cornejo MD  Plan of care signed (Y/N): N  Visit# / total visits:      G-Code (if applicable):          ASA     Time in:   8:12    Timed Treatment: 35 Total Treatment Time:  35  ________________________________________________________________________________________    Pain Level:    /10  SUBJECTIVE:  Pt reports that he has been having some soreness in his upper back lately. OBJECTIVE:     Exercise/Equipment Resistance/Repetitions Other comments   TG 5 min    TA set     BKF  BP cuff   bridge 10x5\"    Supine clams  SL hip ER with stabilization     Supine lumbopelvic stabilization  2x1min    3-way ball compression 5x10\"    PHE Dowel coty   Quadruped multif hip lift           Multif press x15 B    B shoulder extension x15    Standing hip abduction x15 B 15#   Side-stepping  Grey tb   Posterior sling step up x15 B                                         Other Therapeutic Activities:      Manual Treatments:   Prone SI distraction mobilization grade V LLE; ; sidelying lumbar opening mobilization grade IV R with cavitation; LLE leg pull; supine SI distraction mobilization with cavitation; lumbar breaking bread      Modalities:      Test/Measurements:  R ASIS inferior, RLE shorter       ASSESSMENT:    Pt tolerated session well. Corrected SIJD through manual therapy. Pt with increased mobility and lessened symptoms following session.  Progressed to standing exercise without issue, but pt did require minimal cueing for lumbopelvic and lateral hip stabilization. Treatment/Activity Tolerance:   [x]Patient tolerated treatment well [] Patient limited by fatique  []Patient limited by pain [] Patient limited by other medical complications  [] Other:     Goals:    Short term goals  Time Frame for Short term goals: 4 weeks  Short term goal 1: Pt will decrease pain by 50% in frequency or intensity  Short term goal 2: Pt will be ind and compliant with HEP  Short term goal 3: Pt will demonstrate an increase in lumbar joint mobility  Short term goal 4: Pt will ambulate with increased glut activation  Short term goal 5: Pt will maintain pelvic alignment between sessions           Plan: [x] Continue per plan of care [] Alter current plan (see comments)   [] Plan of care initiated [] Hold pending MD visit [] Discharge      Plan for Next Session:  Biomechanical correction of problems as they present. Facilitate correct muscle firing patterns and activation with appropriate activities. Progress patient as tolerated.      Re-Certification Due Date:         Signature:  Akosua Flores , PT

## 2021-03-22 ENCOUNTER — HOSPITAL ENCOUNTER (OUTPATIENT)
Dept: PHYSICAL THERAPY | Age: 46
Setting detail: THERAPIES SERIES
Discharge: HOME | End: 2021-03-22
Payer: COMMERCIAL

## 2021-03-22 PROCEDURE — 97140 MANUAL THERAPY 1/> REGIONS: CPT

## 2021-03-22 PROCEDURE — 97110 THERAPEUTIC EXERCISES: CPT

## 2021-03-22 NOTE — FLOWSHEET NOTE
[facility], [facility], [address] [place] [address] Dr  Phone: [phone]  Fax [phone]    Physical Therapy Daily Treatment Note    Date:  3/22/2021    Patient Name:  Reena Mclain    :  1975  MRN: [id number]  Restrictions/Precautions:    Medical/Treatment Diagnosis Information:  · Diagnosis: left sided LBP with sciatica  Insurance/Certification information:  PT Insurance Information: [employer]  Physician Information:  Referring Practitioner: Luis Cornejo MD  Plan of care signed (Y/N): N  Visit# / total visits:   2    G-Code (if applicable):          ASA     Time in:   8:45    Timed Treatment: 35 Total Treatment Time:  35  ________________________________________________________________________________________    Pain Level:    /10  SUBJECTIVE:  Pt reports that he has been having some soreness in his upper back lately. OBJECTIVE:     Exercise/Equipment Resistance/Repetitions Other comments   TG 5 min    TA set     BKF  BP cuff   bridge 10x5\"    Supine clams  SL hip ER with stabilization     Supine lumbopelvic stabilization  2x1min    3-way ball compression 5x10\"    PHE Dowel coty   Quadruped multif hip lift           Multif press x15 B    B shoulder extension x15    Standing hip abduction     Extension x15 B  x15 B 15#  40#   Side-stepping  Grey tb   Posterior sling step up x15 B    Rockerboard 1 min rocking  1 min balance                                  Other Therapeutic Activities:      Manual Treatments:   Prone SI distraction mobilization grade V LLE; ; sidelying lumbar opening mobilization grade IV R with cavitation; LLE leg pull; supine SI distraction mobilization with cavitation; lumbar breaking bread; skin rolling; seated thoracic hug, seated CTJ lift with cavitation    Modalities:      Test/Measurements:  R ASIS inferior, RLE shorter       ASSESSMENT:    Pt tolerated session well.  Pt with increased mobility and lessened symptoms following session. Progressed to standing exercise without issue, but pt did require minimal cueing for lumbopelvic and lateral hip stabilization. Treatment/Activity Tolerance:   [x]Patient tolerated treatment well [] Patient limited by fatique  []Patient limited by pain [] Patient limited by other medical complications  [] Other:     Goals:    Short term goals  Time Frame for Short term goals: 4 weeks  Short term goal 1: Pt will decrease pain by 50% in frequency or intensity  Short term goal 2: Pt will be ind and compliant with HEP  Short term goal 3: Pt will demonstrate an increase in lumbar joint mobility  Short term goal 4: Pt will ambulate with increased glut activation  Short term goal 5: Pt will maintain pelvic alignment between sessions           Plan: [x] Continue per plan of care [] Alter current plan (see comments)   [] Plan of care initiated [] Hold pending MD visit [] Discharge      Plan for Next Session:  Biomechanical correction of problems as they present. Facilitate correct muscle firing patterns and activation with appropriate activities. Progress patient as tolerated.      Re-Certification Due Date:         Signature:  Kevan De La Torre , PT

## 2021-03-26 ENCOUNTER — HOSPITAL ENCOUNTER (OUTPATIENT)
Dept: PHYSICAL THERAPY | Age: 46
Setting detail: THERAPIES SERIES
Discharge: HOME | End: 2021-03-26
Payer: COMMERCIAL

## 2021-03-26 PROCEDURE — 97140 MANUAL THERAPY 1/> REGIONS: CPT

## 2021-03-26 PROCEDURE — 97110 THERAPEUTIC EXERCISES: CPT

## 2021-03-26 NOTE — FLOWSHEET NOTE
shorter       ASSESSMENT:    Pt tolerated session well. Pt with increased mobility and lessened symptoms following session. Progressed to standing exercise without issue, but pt did require minimal cueing for lumbopelvic and lateral hip stabilization. Treatment/Activity Tolerance:   [x]Patient tolerated treatment well [] Patient limited by fatique  []Patient limited by pain [] Patient limited by other medical complications  [] Other:     Goals:    Short term goals  Time Frame for Short term goals: 4 weeks  Short term goal 1: Pt will decrease pain by 50% in frequency or intensity  Short term goal 2: Pt will be ind and compliant with HEP  Short term goal 3: Pt will demonstrate an increase in lumbar joint mobility  Short term goal 4: Pt will ambulate with increased glut activation  Short term goal 5: Pt will maintain pelvic alignment between sessions           Plan: [x] Continue per plan of care [] Alter current plan (see comments)   [] Plan of care initiated [] Hold pending MD visit [] Discharge      Plan for Next Session:  Biomechanical correction of problems as they present. Facilitate correct muscle firing patterns and activation with appropriate activities. Progress patient as tolerated.      Re-Certification Due Date:         Signature:  Little Yung , PT

## 2021-03-30 NOTE — PRE-CERTIFICATION NOTE
[facility] approved 60 units of each CPT code April 1 to June 1,2021.      [id number]   [id number]   [facility]. # [id number]

## 2021-04-06 ENCOUNTER — HOSPITAL ENCOUNTER (OUTPATIENT)
Dept: PHYSICAL THERAPY | Age: 46
Setting detail: THERAPIES SERIES
Discharge: HOME | End: 2021-04-06
Payer: COMMERCIAL

## 2021-04-06 PROCEDURE — 97110 THERAPEUTIC EXERCISES: CPT

## 2021-04-06 PROCEDURE — 97140 MANUAL THERAPY 1/> REGIONS: CPT

## 2021-04-06 NOTE — FLOWSHEET NOTE
[facility] and Therapy[address]  Phone: [phone]  Fax [phone]    Physical Therapy Daily Treatment Note    Date:  2021    Patient Name:  Gabrielle Murphy    :  1975  MRN: [id number]  Restrictions/Precautions:    Medical/Treatment Diagnosis Information:  · Diagnosis: left sided LBP with sciatica  Insurance/Certification information:  [facility]  Physician Information:  Referring Practitioner: Hernan Mata MD  Plan of care signed (Y/N): N  Visit# / total visits:      G-Code (if applicable):          ASA     Time in:   3:45    Timed Treatment: 45 Total Treatment Time:  45  ________________________________________________________________________________________    Pain Level:    /10  SUBJECTIVE:  Pt reports that his mid back is feeling better. Has been going to the gym once a week. OBJECTIVE:     Exercise/Equipment Resistance/Repetitions Other comments   TG 5 min    TA set     BKF  BP cuff   bridge 10x5\"    Supine clams  SL hip ER with stabilization     Supine lumbopelvic stabilization  2x1min    3-way ball compression 5x10\"    PHE Dowel coty   Quadruped multif hip lift           Multif press x15 B    B shoulder extension x15    Standing hip abduction     Extension x15 B  x15 B 15#  40#   Side-stepping  Grey tb   Posterior sling step up x15 B    Rockerboard 1 min rocking  1 min balance    Dowel coty training 5 min                           Other Therapeutic Activities:      Manual Treatments:   ; ; sidelying lumbar opening mobilization grade IV R with cavitation; LLE leg pull; supine SI distraction mobilization with cavitation; lumbar breaking bread; skin rolling; seated thoracic hug, seated CTJ lift with cavitation    Modalities:      Test/Measurements:  R ASIS inferior, RLE shorter       ASSESSMENT:    Pt tolerated session well.  Pt with increased mobility and lessened symptoms following session. Progressed to standing exercise without issue, but pt did require minimal cueing for lumbopelvic and lateral hip stabilization. Pt demonstrated poor initial sequencing with dowel coty training. Treatment/Activity Tolerance:   [x]Patient tolerated treatment well [] Patient limited by fatique  []Patient limited by pain [] Patient limited by other medical complications  [] Other:     Goals:    Short term goals  Time Frame for Short term goals: 4 weeks  Short term goal 1: Pt will decrease pain by 50% in frequency or intensity  Short term goal 2: Pt will be ind and compliant with HEP  Short term goal 3: Pt will demonstrate an increase in lumbar joint mobility  Short term goal 4: Pt will ambulate with increased glut activation  Short term goal 5: Pt will maintain pelvic alignment between sessions           Plan: [x] Continue per plan of care [] Alter current plan (see comments)   [] Plan of care initiated [] Hold pending MD visit [] Discharge      Plan for Next Session:  Biomechanical correction of problems as they present. Facilitate correct muscle firing patterns and activation with appropriate activities. Progress patient as tolerated.      Re-Certification Due Date:         Signature:  Estella Rodriguez , PT

## 2021-04-08 ENCOUNTER — HOSPITAL ENCOUNTER (OUTPATIENT)
Dept: PHYSICAL THERAPY | Age: 46
Setting detail: THERAPIES SERIES
Discharge: HOME | End: 2021-04-08
Payer: COMMERCIAL

## 2021-04-08 PROCEDURE — 97110 THERAPEUTIC EXERCISES: CPT

## 2021-04-08 PROCEDURE — 97140 MANUAL THERAPY 1/> REGIONS: CPT

## 2021-04-08 NOTE — FLOWSHEET NOTE
[facility], [facility] [address]   Phone: [phone]  Fax [phone]    Physical Therapy Daily Treatment Note    Date:  2021    Patient Name:  Kathya Crowder    :  1975  MRN: [id number]  Restrictions/Precautions:    Medical/Treatment Diagnosis Information:  · Diagnosis: left sided LBP with sciatica  Insurance/Certification information:  [facility]  Physician Information:  Referring Practitioner: Sharita Moreno MD  Plan of care signed (Y/N): N  Visit# / total visits:      G-Code (if applicable):          ASA     Time in:   3:45    Timed Treatment: 45 Total Treatment Time:  45  ________________________________________________________________________________________    Pain Level:    /10  SUBJECTIVE:  Nothing new to report. Still doing well. OBJECTIVE:     Exercise/Equipment Resistance/Repetitions Other comments   TG 5 min    TA set     BKF  BP cuff   bridge 10x5\"    Supine clams  SL hip ER with stabilization     Supine lumbopelvic stabilization  2x1min    3-way ball compression 5x10\"    PHE Dowel coty   Quadruped multif hip lift           Multif press x15 B    B shoulder extension x15    Standing hip abduction     Extension x15 B  x15 B 15#  40#   Side-stepping  Grey tb   Posterior sling step up x15 B    Rockerboard 1 min rocking  1 min balance    Dowel coty training 5 min                           Other Therapeutic Activities:      Manual Treatments:   ; ; sidelying lumbar opening mobilization grade IV R with cavitation; LLE leg pull; supine SI distraction mobilization with cavitation; lumbar breaking bread; skin rolling;     Modalities:      Test/Measurements:  R ASIS inferior, RLE shorter       ASSESSMENT:    Pt tolerated session well. Pt with increased mobility and lessened symptoms following session.  Progressed to standing exercise without issue, but pt did require minimal cueing for lumbopelvic and lateral hip stabilization. Pt demonstrated poor initial sequencing with dowel coty training. Treatment/Activity Tolerance:   [x]Patient tolerated treatment well [] Patient limited by fatique  []Patient limited by pain [] Patient limited by other medical complications  [] Other:     Goals:    Short term goals  Time Frame for Short term goals: 4 weeks  Short term goal 1: Pt will decrease pain by 50% in frequency or intensity  Short term goal 2: Pt will be ind and compliant with HEP  Short term goal 3: Pt will demonstrate an increase in lumbar joint mobility  Short term goal 4: Pt will ambulate with increased glut activation  Short term goal 5: Pt will maintain pelvic alignment between sessions           Plan: [x] Continue per plan of care [] Alter current plan (see comments)   [] Plan of care initiated [] Hold pending MD visit [] Discharge      Plan for Next Session:  Biomechanical correction of problems as they present. Facilitate correct muscle firing patterns and activation with appropriate activities. Progress patient as tolerated.      Re-Certification Due Date:         Signature:  Oseas Hightower , PT

## 2021-04-08 NOTE — PROGRESS NOTES
[facility] Therapy, [facility], [address], [address]  Phone: [phone]  Fax [phone]    Physical Therapy Progress Note    Date:  2021    Patient Name:  Gallito Betts    :  1975  MRN: [id number]  Restrictions/Precautions:    Medical/Treatment Diagnosis Information:  · Diagnosis: left sided LBP with sciatica  Insurance/Certification information:  PT Insurance Information: [employer]  Physician Information:  Referring Practitioner: Elaina Holliday MD  Plan of care signed (Y/N): N  Visit# / total visits:     ________________________________________________________________________________________    Pain Level:    /10  SUBJECTIVE:  Pt reports that his mid back is finally feeling better. Has been going to the gym once a week but would like to start going more. He is almost done renovating his last house - makes note that he has bene doing most of the work himself but should probably hire someone to help for his future projects. ASSESSMENT:    Pt has completed 12 visits of physical therapy and has shown good improvements with strength, ROM, movement mechanics and functional mobility. Pt no longer complaining of lumbar symptoms. Pt does report intermitted thoracic symptoms with associated hypomobility upon joint testing. Pt does continue with altered movement mechanics during standing tasks. Pt would benefit from additional therapy at 2x/wk for 3 weeks to address remaining impairments and return to work without limitations.     Treatment/Activity Tolerance:   [x]Patient tolerated treatment well [] Patient limited by fatique  []Patient limited by pain [] Patient limited by other medical complications  [] Other:     Goals:    Short term goals  Time Frame for Short term goals: 4 weeks  Short term goal 1: Pt will decrease pain by 50% in frequency or intensity - MET  Short term goal 2: Pt will be ind and compliant with HEP - MET  Short term goal 3: Pt will demonstrate an increase in lumbar joint mobility - MET  Short term goal 4: Pt will ambulate with increased glut activation -progressing towards  Short term goal 5: Pt will maintain pelvic alignment between sessions - MET at times          Plan: [] Continue per plan of care [x] Alter current plan (see comments)   [] Plan of care initiated [] Hold pending MD visit [] Discharge         Signature:  Giuliano Sarabia PT        Dear Referring Practitioner: Dr. Zehra Monahan,     We had the pleasure of evaluating the following patient for physical therapy services at [facility]. A summary of our findings can be found in the initial assessment below. This includes our plan of care. If you have any questions or concerns regarding these findings, please do not hesitate to contact me at the office phone number.   Thank you for the referral.         Physician Signature:_______________________________Date:__________________  By signing above (or electronic signature), therapists plan is approved by physician

## 2021-04-14 ENCOUNTER — HOSPITAL ENCOUNTER (OUTPATIENT)
Dept: PHYSICAL THERAPY | Age: 46
Setting detail: THERAPIES SERIES
Discharge: HOME | End: 2021-04-14
Payer: COMMERCIAL

## 2021-04-15 ENCOUNTER — HOSPITAL ENCOUNTER (OUTPATIENT)
Dept: PHYSICAL THERAPY | Age: 46
Setting detail: THERAPIES SERIES
Discharge: HOME | End: 2021-04-15
Payer: COMMERCIAL

## 2021-04-15 PROCEDURE — 97140 MANUAL THERAPY 1/> REGIONS: CPT

## 2021-04-15 PROCEDURE — 97110 THERAPEUTIC EXERCISES: CPT

## 2021-04-15 NOTE — FLOWSHEET NOTE
[facility]. and Therapy, [address]  Phone: [phone]  Fax [phone]    Physical Therapy Daily Treatment Note    Date:  4/15/2021    Patient Name:  Pedro Powell    :  1975  MRN: [id number]  Restrictions/Precautions:    Medical/Treatment Diagnosis Information:  · Diagnosis: left sided LBP with sciatica  Insurance/Certification information:  PT Insurance Information: [facility]  Physician Information:  Referring Practitioner: Ashish Cleaning MD  Plan of care signed (Y/N): N  Visit# / total visits:   2    G-Code (if applicable):          ASA     Time in:   8:20    Timed Treatment: 40 Total Treatment Time:  40  ________________________________________________________________________________________    Pain Level:    /10  SUBJECTIVE:  Pt reports that the catch in his mid back has finally released. \"As we work on certain places, I feel like it is putting pressure on other places. \"    OBJECTIVE:     Exercise/Equipment Resistance/Repetitions Other comments   TG 5 min    TA set     BKF  BP cuff   bridge 10x5\"    Supine clams  SL hip ER with stabilization     Supine lumbopelvic stabilization  2x1min    3-way ball compression 5x10\"    PHE Dowel coty   Quadruped multif hip lift           Multif press x15 B    B shoulder extension x15    Standing hip abduction     Extension x15 B  x15 B 20#  40#   Side-stepping  Grey tb   Posterior sling step up x15 B    Rockerboard 1 min rocking  1 min balance    Dowel coty training                            Other Therapeutic Activities:      Manual Treatments:   ; ; sidelying lumbar opening mobilization grade IV R with cavitation; LLE leg pull; supine SI distraction mobilization with cavitation; lumbar breaking bread; skin rolling;     Modalities:      Test/Measurements:  R ASIS inferior, RLE shorter       ASSESSMENT:    Pt tolerated session well.  Pt with increased mobility and lessened symptoms following session. Treatment/Activity Tolerance:   [x]Patient tolerated treatment well [] Patient limited by fatique  []Patient limited by pain [] Patient limited by other medical complications  [] Other:     Goals:    Short term goals  Time Frame for Short term goals: 4 weeks  Short term goal 1: Pt will decrease pain by 50% in frequency or intensity  Short term goal 2: Pt will be ind and compliant with HEP  Short term goal 3: Pt will demonstrate an increase in lumbar joint mobility  Short term goal 4: Pt will ambulate with increased glut activation  Short term goal 5: Pt will maintain pelvic alignment between sessions           Plan: [x] Continue per plan of care [] Alter current plan (see comments)   [] Plan of care initiated [] Hold pending MD visit [] Discharge      Plan for Next Session:  Biomechanical correction of problems as they present. Facilitate correct muscle firing patterns and activation with appropriate activities. Progress patient as tolerated.      Re-Certification Due Date:         Signature:  Devonte Hill , PT

## 2021-04-16 ENCOUNTER — HOSPITAL ENCOUNTER (OUTPATIENT)
Dept: PHYSICAL THERAPY | Age: 46
Setting detail: THERAPIES SERIES
Discharge: HOME | End: 2021-04-16
Payer: COMMERCIAL

## 2021-04-16 PROCEDURE — 97110 THERAPEUTIC EXERCISES: CPT

## 2021-04-16 PROCEDURE — 97140 MANUAL THERAPY 1/> REGIONS: CPT

## 2021-04-16 NOTE — FLOWSHEET NOTE
[facility], [facility], [address] [facility], [address] [place] [address] Dr  Phone: [phone]  Fax [phone]    Physical Therapy Daily Treatment Note    Date:  2021    Patient Name:  Gabrielle Murphy    :  1975  MRN: [id number]  Restrictions/Precautions:    Medical/Treatment Diagnosis Information:  · Diagnosis: left sided LBP with sciatica  Insurance/Certification information:  [facility]  Physician Information:  Referring Practitioner: Hernan Mata MD  Plan of care signed (Y/N): N  Visit# / total visits:   4 3/    G-Code (if applicable):          ASA     Time in:   7:24   Timed Treatment: 38 Total Treatment Time:  38  ________________________________________________________________________________________    Pain Level:    /10  SUBJECTIVE:  Pt reports that he is still feeling the cath in his mid back a little. Notes no pain in the lower back. OBJECTIVE:     Exercise/Equipment Resistance/Repetitions Other comments   TG 5 min    TA set     BKF  BP cuff   bridge 10x5\" Green band at knees   Supine clams  SL hip ER with stabilization     Supine lumbopelvic stabilization  2x1min    3-way ball compression 5x10\"    PHE Dowel coty   Quadruped multif hip lift           Multif press x15 B    B shoulder extension x15    Standing hip abduction     Extension x15 B  x15 B 20#  40#   Side-stepping  Grey tb   Posterior sling step up x15 B    Rockerboard     Dowel coty training                            Other Therapeutic Activities:      Manual Treatments:   ; ; sidelying lumbar opening mobilization grade IV R with cavitation; LLE leg pull; supine SI distraction mobilization with cavitation; lumbar breaking bread; skin rolling; seated CTJ lift with cavitation    Modalities:      Test/Measurements:  R ASIS inferior, RLE shorter       ASSESSMENT:    Pt tolerated session well.  Pt with increased mobility and lessened symptoms following session. Treatment/Activity Tolerance:   [x]Patient tolerated treatment well [] Patient limited by fatique  []Patient limited by pain [] Patient limited by other medical complications  [] Other:     Goals:    Short term goals  Time Frame for Short term goals: 4 weeks  Short term goal 1: Pt will decrease pain by 50% in frequency or intensity  Short term goal 2: Pt will be ind and compliant with HEP  Short term goal 3: Pt will demonstrate an increase in lumbar joint mobility  Short term goal 4: Pt will ambulate with increased glut activation  Short term goal 5: Pt will maintain pelvic alignment between sessions           Plan: [x] Continue per plan of care [] Alter current plan (see comments)   [] Plan of care initiated [] Hold pending MD visit [] Discharge      Plan for Next Session:  Biomechanical correction of problems as they present. Facilitate correct muscle firing patterns and activation with appropriate activities. Progress patient as tolerated.      Re-Certification Due Date:         Signature:  Stacia Carbajal , PT

## 2021-04-28 ENCOUNTER — HOSPITAL ENCOUNTER (OUTPATIENT)
Dept: PHYSICAL THERAPY | Age: 46
Setting detail: THERAPIES SERIES
Discharge: HOME | End: 2021-04-28
Payer: COMMERCIAL

## 2021-04-28 PROCEDURE — 97110 THERAPEUTIC EXERCISES: CPT

## 2021-04-28 NOTE — FLOWSHEET NOTE
[facility] [address] [facility], [address] [place] [address]  Phone: [phone]  Fax [phone]    Physical Therapy Daily Treatment Note    Date:  2021    Patient Name:  Shar Nolasco    :  1975  MRN: [id number]  Restrictions/Precautions:    Medical/Treatment Diagnosis Information:  · Diagnosis: left sided LBP with sciatica  Insurance/Certification information:  [facility]  Physician Information:  Referring Practitioner: Kannan Luna MD  Plan of care signed (Y/N): N  Visit# / total visits:      G-Code (if applicable):          ASA     Time in:   12:47   Timed Treatment: 30 Total Treatment Time:  30  ________________________________________________________________________________________    Pain Level:    /10  SUBJECTIVE:  Pt reports that he is feeling good. Has been working out during the week. OBJECTIVE:     Exercise/Equipment Resistance/Repetitions Other comments   TG 5 min    TA set     BKF  BP cuff   bridge 10x5\" Green band at knees   Supine clams  SL hip ER with stabilization     Supine lumbopelvic stabilization  2x1min    3-way ball compression 5x10\" ea    PHE Dowel coty   Quadruped multif hip lift           Multif press x15 B    B shoulder extension x15    Standing hip abduction     Extension x15 B  x15 B 20#  40#   Side-stepping  Grey tb   Posterior sling step up x15 B    Rockerboard     Dowel coty training 5 min                           Other Therapeutic Activities:      Manual Treatments:   ; ; sidelying lumbar opening mobilization grade IV R with cavitation; LLE leg pull; supine SI distraction mobilization with cavitation;     Modalities:      Test/Measurements:  R ASIS inferior, RLE shorter       ASSESSMENT:    Pt tolerated session well. Pt with increased mobility and lessened symptoms following session.     Treatment/Activity Tolerance:   [x]Patient tolerated treatment well [] Patient limited by fatique  []Patient limited by pain [] Patient limited by other medical complications  [] Other:     Goals:    Short term goals  Time Frame for Short term goals: 4 weeks  Short term goal 1: Pt will decrease pain by 50% in frequency or intensity  Short term goal 2: Pt will be ind and compliant with HEP  Short term goal 3: Pt will demonstrate an increase in lumbar joint mobility  Short term goal 4: Pt will ambulate with increased glut activation  Short term goal 5: Pt will maintain pelvic alignment between sessions           Plan: [x] Continue per plan of care [] Alter current plan (see comments)   [] Plan of care initiated [] Hold pending MD visit [] Discharge      Plan for Next Session:  Biomechanical correction of problems as they present. Facilitate correct muscle firing patterns and activation with appropriate activities. Progress patient as tolerated.      Re-Certification Due Date:         Signature:  Devonte Hill PT

## 2021-05-03 ENCOUNTER — HOSPITAL ENCOUNTER (OUTPATIENT)
Dept: PHYSICAL THERAPY | Age: 46
Setting detail: THERAPIES SERIES
Discharge: HOME | End: 2021-05-03
Payer: COMMERCIAL

## 2021-05-03 PROCEDURE — 97110 THERAPEUTIC EXERCISES: CPT

## 2021-05-03 NOTE — FLOWSHEET NOTE
[facility], [address], [address] [place] [address] Dr  Phone: [phone]  Fax [phone]    Physical Therapy Daily Treatment Note    Date:  5/3/2021    Patient Name:  Gloria Pace    :  1975  MRN: [id number]  Restrictions/Precautions:    Medical/Treatment Diagnosis Information:  · Diagnosis: left sided LBP with sciatica  Insurance/Certification information:  PT Insurance Information: [facility]  Physician Information:  Referring Practitioner: Ernst Malin MD  Plan of care signed (Y/N): N  Visit# / total visits:      G-Code (if applicable):          ASA     Time in:   8:17   Timed Treatment: 30 Total Treatment Time:  30  ________________________________________________________________________________________    Pain Level:    /10  SUBJECTIVE:  Pt reports that he played softball yesterday - notes no issues. Pt reports that he hit two triples and had no issues running the bases. OBJECTIVE:     Exercise/Equipment Resistance/Repetitions Other comments   TG 5 min    TA set     BKF  BP cuff   bridge 10x5\" Green band at knees   Supine clams  SL hip ER with stabilization     Supine lumbopelvic stabilization  2x1min    3-way ball compression 5x10\" ea    PHE Dowel coty   Quadruped multif hip lift           Multif press x15 B    B shoulder extension x15    Standing hip abduction     Extension x15 B  x15 B 20#  40#   Side-stepping  Grey tb   Posterior sling step up x15 B    Rockerboard     Dowel coty training 5 min                           Other Therapeutic Activities:      Manual Treatments:   ; ; sidelying lumbar opening mobilization grade IV R with cavitation; LLE leg pull; supine SI distraction mobilization with cavitation;     Modalities:      Test/Measurements:  R ASIS inferior, RLE shorter       ASSESSMENT:    Pt tolerated session well.  Pt with increased mobility and lessened symptoms following session. Treatment/Activity Tolerance:   [x]Patient tolerated treatment well [] Patient limited by fatique  []Patient limited by pain [] Patient limited by other medical complications  [] Other:     Goals:    Short term goals  Time Frame for Short term goals: 4 weeks  Short term goal 1: Pt will decrease pain by 50% in frequency or intensity  Short term goal 2: Pt will be ind and compliant with HEP  Short term goal 3: Pt will demonstrate an increase in lumbar joint mobility  Short term goal 4: Pt will ambulate with increased glut activation  Short term goal 5: Pt will maintain pelvic alignment between sessions           Plan: [x] Continue per plan of care [] Alter current plan (see comments)   [] Plan of care initiated [] Hold pending MD visit [] Discharge      Plan for Next Session:  Biomechanical correction of problems as they present. Facilitate correct muscle firing patterns and activation with appropriate activities. Progress patient as tolerated.      Re-Certification Due Date:         Signature:  Foster Mccurdy , PT

## 2021-05-06 ENCOUNTER — HOSPITAL ENCOUNTER (OUTPATIENT)
Dept: PHYSICAL THERAPY | Age: 46
Setting detail: THERAPIES SERIES
Discharge: HOME | End: 2021-05-06
Payer: COMMERCIAL

## 2021-05-06 NOTE — PROGRESS NOTES
Physical Therapy  Cancellation/No-show Note  Patient Name:  Mary Anne Credit  :  1975   Date:  2021  Cancels to date: 2  No-shows to date: 1    For today's appointment patient:  [x] Cancelled  [] Rescheduled appointment  [] No-show     Reason given by patient:  [] Patient ill  [] Conflicting appointment  [] No transportation    [] Conflict with work  [] No reason given  [] Other:  Pt arrived 15 minutes late for the third appointment in a row. Did not see pt this date.  Next visit will be a re-eval.   Comments:      Electronically signed by:  Ahsan Richardson PT

## 2021-05-12 ENCOUNTER — HOSPITAL ENCOUNTER (OUTPATIENT)
Dept: PHYSICAL THERAPY | Age: 46
Setting detail: THERAPIES SERIES
Discharge: HOME | End: 2021-05-12
Payer: COMMERCIAL

## 2021-05-12 PROCEDURE — 97110 THERAPEUTIC EXERCISES: CPT

## 2021-05-12 NOTE — PROGRESS NOTES
[facility], [facility][1], [address] [place], [address]  Phone: [phone]  Fax [phone]    Physical Therapy Discharge Note    Date:  2021    Patient Name:  Hien Pinon    :  1975  MRN: [id number]  Restrictions/Precautions:    Medical/Treatment Diagnosis Information:  · Diagnosis: left sided LBP with sciatica  Insurance/Certification information:  PT Insurance Information: [facility]  Physician Information:  Referring Practitioner: Niharika Hilliard MD  Plan of care signed (Y/N): N  Visit# / total visits:      G-Code (if applicable):          ASA   ( at [facility])  ________________________________________________________________________________________    SUBJECTIVE:  Pt reports that he is doing well. Notes that the pain has subsided but he is worried about it returning. He has returned to playing softball and exercising at the gym. ASSESSMENT:   Pt progressed well with course of physical therapy, and no longer displays symptoms from initial evaluation. Pt demonstrated equal strength and ROM B and has been able to return to all necessary ADLs and work-related activities. Pt has returned to playing softball with issue. Pt has met all established goals, and feels confident continuing with HEP. Will discharge pt from formal PT at this time.     Treatment/Activity Tolerance:   [x]Patient tolerated treatment well [] Patient limited by fatique  []Patient limited by pain [] Patient limited by other medical complications  [] Other:     Goals:    Short term goals  Time Frame for Short term goals: 4 weeks  Short term goal 1: Pt will decrease pain by 50% in frequency or intensity - MET  Short term goal 2: Pt will be ind and compliant with HEP - MET  Short term goal 3: Pt will demonstrate an increase in lumbar joint mobility - MET  Short term goal 4: Pt will ambulate with increased glut activation - MET  Short term goal 5: Pt will maintain pelvic alignment between sessions - MET          Plan: [] Continue per plan of care [] Alter current plan (see comments)   [] Plan of care initiated [] Hold pending MD visit [x] Discharge         Signature:  Foster Mccurdy PT                Physician Signature:_______________________________Date:__________________  By signing above (or electronic signature), therapists plan is approved by physician

## 2021-05-12 NOTE — FLOWSHEET NOTE
[facility]  [facility], [address], [address] [place] [address] Dr  Phone: [phone]  Fax [phone]    Physical Therapy Daily Treatment Note    Date:  2021    Patient Name:  Hien Pinon    :  1975  MRN: [id number]  Restrictions/Precautions:    Medical/Treatment Diagnosis Information:  · Diagnosis: left sided LBP with sciatica  Insurance/Certification information:  PT Insurance Information: [facility]  Physician Information:  Referring Practitioner: Niharika Hilliard MD  Plan of care signed (Y/N): N  Visit# / total visits:      G-Code (if applicable):          ASA     Time in:   2:45  Timed Treatment: 45 Total Treatment Time:  45  ________________________________________________________________________________________    Pain Level:    /10  SUBJECTIVE:  Pt reports that he is doing well. Notes that the pain has subsided but he is worried about it returning. He has returned to playing softball and exercising at the gym. OBJECTIVE:     Exercise/Equipment Resistance/Repetitions Other comments   TG 5 min    TA set     BKF  BP cuff   bridge 10x5\" Green band at knees   Supine clams  SL hip ER with stabilization     Supine lumbopelvic stabilization  2x1min    3-way ball compression 5x10\" ea    PHE Dowel coty   Quadruped multif hip lift           Multif press x15 B    B shoulder extension x15    Standing hip abduction     Extension x15 B  x15 B 20#  40#   Side-stepping  Grey tb   Posterior sling step up x15 B    Rockerboard 1 min rocking  1 min balance    Dowel coty training                            Other Therapeutic Activities:      Manual Treatments:   ; ; si    Modalities:      Test/Measurements:         ASSESSMENT:    Pt tolerated session well. Pt with minimal cueing required for lateral hip stabilization during single limb exercise.      Treatment/Activity Tolerance:   [x]Patient tolerated treatment well [] Patient limited by fatique  []Patient limited by pain [] Patient limited by other medical complications  [] Other:     Goals:    Short term goals  Time Frame for Short term goals: 4 weeks  Short term goal 1: Pt will decrease pain by 50% in frequency or intensity  Short term goal 2: Pt will be ind and compliant with HEP  Short term goal 3: Pt will demonstrate an increase in lumbar joint mobility  Short term goal 4: Pt will ambulate with increased glut activation  Short term goal 5: Pt will maintain pelvic alignment between sessions           Plan: [x] Continue per plan of care [] Alter current plan (see comments)   [] Plan of care initiated [] Hold pending MD visit [] Discharge      Plan for Next Session:  Biomechanical correction of problems as they present. Facilitate correct muscle firing patterns and activation with appropriate activities. Progress patient as tolerated.      Re-Certification Due Date:         Signature:  Paul Maciel PT

## 2021-05-14 ENCOUNTER — HOSPITAL ENCOUNTER (OUTPATIENT)
Dept: PHYSICAL THERAPY | Age: 46
Setting detail: THERAPIES SERIES
End: 2021-05-14
Payer: COMMERCIAL

## 2021-09-09 ENCOUNTER — HOSPITAL ENCOUNTER (OUTPATIENT)
Dept: MRI IMAGING | Age: 46
Discharge: HOME | End: 2021-09-09
Payer: COMMERCIAL

## 2021-09-09 DIAGNOSIS — M54.41 ACUTE BACK PAIN WITH SCIATICA, RIGHT: ICD-10-CM

## 2021-09-09 PROCEDURE — 6360000004 HC RX CONTRAST MEDICATION: Performed by: FAMILY MEDICINE

## 2021-09-09 PROCEDURE — 72158 MRI LUMBAR SPINE W/O & W/DYE: CPT

## 2021-09-09 PROCEDURE — A9579 GAD-BASE MR CONTRAST NOS,1ML: HCPCS | Performed by: FAMILY MEDICINE

## 2021-09-09 RX ADMIN — GADOTERIDOL 18 ML: 279.3 INJECTION, SOLUTION INTRAVENOUS at 16:25
